# Patient Record
(demographics unavailable — no encounter records)

---

## 2024-11-04 NOTE — ASSESSMENT
[FreeTextEntry1] : Pt with above medical issues. Had long discussion with son and daughter about progressive nature of this disease - with aberrant behavior could consider Seroquel - discussed black box warning of sudden death Will increase Lexapro to 20 mg per day and to fu in 1 month. 
no fever/no nausea/no chills/no vomiting/no hematuria
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
## 2024-12-03 NOTE — ASSESSMENT
[FreeTextEntry1] : Pt with above medical issues. Will add Bupropion 150 mg per day Continue other meds FU 1 month

## 2024-12-03 NOTE — PHYSICAL EXAM
[Normal Sclera/Conjunctiva] : normal sclera/conjunctiva [No Edema] : there was no peripheral edema [Normal] : no posterior cervical lymphadenopathy and no anterior cervical lymphadenopathy [de-identified] : Pleasant - oriented to person only

## 2024-12-03 NOTE — HISTORY OF PRESENT ILLNESS
[FreeTextEntry1] : FU for anxiety- depression, hypertension, dementia Better on higher dose Lexapro  - not belligerent but still depressed. Teary at times

## 2025-01-30 NOTE — ASSESSMENT
[FreeTextEntry1] : Pt with above medical issues. Will increase Wellbutrin to 300 mg per day Continue other meds FU 4-6 weeks

## 2025-01-30 NOTE — HISTORY OF PRESENT ILLNESS
[FreeTextEntry1] : FU for anxiety-depression - still sad and some outburst on meds Eating OK Moving bowels

## 2025-01-30 NOTE — PHYSICAL EXAM
[No Acute Distress] : no acute distress [Normal Oropharynx] : the oropharynx was normal [Normal Rate] : normal rate  [Regular Rhythm] : with a regular rhythm [Normal S1, S2] : normal S1 and S2 [II] : a grade 2 [No Edema] : there was no peripheral edema [Normal] : no posterior cervical lymphadenopathy and no anterior cervical lymphadenopathy [de-identified] : venous stasis changes

## 2025-03-13 NOTE — ASSESSMENT
[FreeTextEntry1] : Pt with above medical issues. Still with some anger and crying - will increase Seroquel to 50 mg qhs After long discussion with children - will DC Xarelto as she has fallen and is bruised - they understand risk vs benefits. Continue other meds FU 6 weeks.

## 2025-03-13 NOTE — PHYSICAL EXAM
[Normal Sclera/Conjunctiva] : normal sclera/conjunctiva [No Edema] : there was no peripheral edema [Normal] : soft, non-tender, non-distended, no masses palpated, no HSM and normal bowel sounds [de-identified] : [facility]  [de-identified] : Oriented to person only

## 2025-03-13 NOTE — HISTORY OF PRESENT ILLNESS
[FreeTextEntry1] : FU for dementia, sp fall and hospitalization Slowly deteriorating Eating so-so No fevers

## 2025-05-06 NOTE — HISTORY OF PRESENT ILLNESS
[Family Member] : family member [FreeTextEntry1] : FU for dementia -Alzheimer's Pt doing poorly - not recognizing family Eating poorly. Not wanting to be bathed. Lethargic

## 2025-05-06 NOTE — ASSESSMENT
[FreeTextEntry1] : Pt with above medical issues. Pt doing poorly and with progressive dementia -Alzheimer's Had long discussion with family and they agree she should be on hospice care - referral made Meds adjusted

## 2025-05-06 NOTE — PHYSICAL EXAM
[Normal Sclera/Conjunctiva] : normal sclera/conjunctiva [No Edema] : there was no peripheral edema [Normal] : soft, non-tender, non-distended, no masses palpated, no HSM and normal bowel sounds [de-identified] : L:ethargic [de-identified] : Wound on right leg - redressed with Telfa and Bacitracin [de-identified] : Lethargic - not speaking